# Patient Record
Sex: MALE | Race: WHITE | Employment: FULL TIME | URBAN - METROPOLITAN AREA
[De-identification: names, ages, dates, MRNs, and addresses within clinical notes are randomized per-mention and may not be internally consistent; named-entity substitution may affect disease eponyms.]

---

## 2018-10-12 ENCOUNTER — HOSPITAL ENCOUNTER (EMERGENCY)
Facility: HOSPITAL | Age: 23
Discharge: HOME | End: 2018-10-12
Attending: EMERGENCY MEDICINE
Payer: COMMERCIAL

## 2018-10-12 ENCOUNTER — APPOINTMENT (OUTPATIENT)
Dept: GENERAL RADIOLOGY | Facility: HOSPITAL | Age: 23
End: 2018-10-12
Attending: NURSE PRACTITIONER
Payer: COMMERCIAL

## 2018-10-12 VITALS
HEIGHT: 71 IN | OXYGEN SATURATION: 97 % | DIASTOLIC BLOOD PRESSURE: 98 MMHG | TEMPERATURE: 98 F | SYSTOLIC BLOOD PRESSURE: 149 MMHG | RESPIRATION RATE: 15 BRPM | HEART RATE: 98 BPM | WEIGHT: 180 LBS | BODY MASS INDEX: 25.2 KG/M2

## 2018-10-12 DIAGNOSIS — S61.231A GUNSHOT WOUND OF LEFT INDEX FINGER: Primary | ICD-10-CM

## 2018-10-12 PROCEDURE — 96361 HYDRATE IV INFUSION ADD-ON: CPT | Performed by: EMERGENCY MEDICINE

## 2018-10-12 PROCEDURE — 73130 X-RAY EXAM OF HAND: CPT | Performed by: NURSE PRACTITIONER

## 2018-10-12 PROCEDURE — 99284 EMERGENCY DEPT VISIT MOD MDM: CPT | Performed by: EMERGENCY MEDICINE

## 2018-10-12 PROCEDURE — 96375 TX/PRO/DX INJ NEW DRUG ADDON: CPT | Performed by: EMERGENCY MEDICINE

## 2018-10-12 PROCEDURE — 96365 THER/PROPH/DIAG IV INF INIT: CPT | Performed by: EMERGENCY MEDICINE

## 2018-10-12 PROCEDURE — 90471 IMMUNIZATION ADMIN: CPT | Performed by: EMERGENCY MEDICINE

## 2018-10-12 RX ORDER — NAPROXEN 500 MG/1
500 TABLET ORAL 2 TIMES DAILY PRN
Qty: 20 TABLET | Refills: 0 | Status: SHIPPED | OUTPATIENT
Start: 2018-10-12 | End: 2018-10-19

## 2018-10-12 RX ORDER — KETOROLAC TROMETHAMINE 30 MG/ML
30 INJECTION, SOLUTION INTRAMUSCULAR; INTRAVENOUS ONCE
Status: COMPLETED | OUTPATIENT
Start: 2018-10-12 | End: 2018-10-12

## 2018-10-12 RX ORDER — MORPHINE SULFATE 4 MG/ML
4 INJECTION, SOLUTION INTRAMUSCULAR; INTRAVENOUS ONCE
Status: COMPLETED | OUTPATIENT
Start: 2018-10-12 | End: 2018-10-12

## 2018-10-12 RX ORDER — DEXTROAMPHETAMINE SACCHARATE, AMPHETAMINE ASPARTATE MONOHYDRATE, DEXTROAMPHETAMINE SULFATE AND AMPHETAMINE SULFATE 6.25; 6.25; 6.25; 6.25 MG/1; MG/1; MG/1; MG/1
25 CAPSULE, EXTENDED RELEASE ORAL EVERY MORNING
COMMUNITY

## 2018-10-12 RX ORDER — BUPROPION HYDROCHLORIDE 100 MG/1
100 TABLET ORAL 2 TIMES DAILY
COMMUNITY

## 2018-10-12 RX ORDER — CEPHALEXIN 500 MG/1
500 CAPSULE ORAL 4 TIMES DAILY
Qty: 28 CAPSULE | Refills: 0 | Status: SHIPPED | OUTPATIENT
Start: 2018-10-12 | End: 2018-10-19

## 2018-10-12 RX ORDER — BUPIVACAINE HYDROCHLORIDE 2.5 MG/ML
INJECTION, SOLUTION EPIDURAL; INFILTRATION; INTRACAUDAL
Status: COMPLETED
Start: 2018-10-12 | End: 2018-10-12

## 2018-10-12 RX ORDER — HYDROCODONE BITARTRATE AND ACETAMINOPHEN 5; 325 MG/1; MG/1
1 TABLET ORAL EVERY 6 HOURS PRN
Qty: 20 TABLET | Refills: 0 | Status: SHIPPED | OUTPATIENT
Start: 2018-10-12

## 2018-10-12 RX ORDER — BUPIVACAINE HYDROCHLORIDE 2.5 MG/ML
5 INJECTION, SOLUTION EPIDURAL; INFILTRATION; INTRACAUDAL ONCE
Status: COMPLETED | OUTPATIENT
Start: 2018-10-12 | End: 2018-10-12
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
## 2018-11-13 PROBLEM — S62.631S: Status: ACTIVE | Noted: 2018-11-13

## 2021-09-07 ENCOUNTER — OFFICE VISIT (OUTPATIENT)
Dept: URGENT CARE | Facility: CLINIC | Age: 26
End: 2021-09-07
Payer: COMMERCIAL

## 2021-09-07 VITALS
HEIGHT: 71 IN | TEMPERATURE: 98 F | SYSTOLIC BLOOD PRESSURE: 147 MMHG | HEART RATE: 68 BPM | OXYGEN SATURATION: 99 % | BODY MASS INDEX: 24.5 KG/M2 | WEIGHT: 175 LBS | DIASTOLIC BLOOD PRESSURE: 96 MMHG | RESPIRATION RATE: 18 BRPM

## 2021-09-07 DIAGNOSIS — H61.23 BILATERAL IMPACTED CERUMEN: ICD-10-CM

## 2021-09-07 DIAGNOSIS — H92.03 OTALGIA OF BOTH EARS: Primary | ICD-10-CM

## 2021-09-07 PROCEDURE — 3080F PR MOST RECENT DIASTOLIC BLOOD PRESSURE >= 90 MM HG: ICD-10-PCS | Mod: CPTII,S$GLB,, | Performed by: FAMILY MEDICINE

## 2021-09-07 PROCEDURE — 1160F PR REVIEW ALL MEDS BY PRESCRIBER/CLIN PHARMACIST DOCUMENTED: ICD-10-PCS | Mod: CPTII,S$GLB,, | Performed by: FAMILY MEDICINE

## 2021-09-07 PROCEDURE — 3077F SYST BP >= 140 MM HG: CPT | Mod: CPTII,S$GLB,, | Performed by: FAMILY MEDICINE

## 2021-09-07 PROCEDURE — 3008F BODY MASS INDEX DOCD: CPT | Mod: CPTII,S$GLB,, | Performed by: FAMILY MEDICINE

## 2021-09-07 PROCEDURE — 99203 OFFICE O/P NEW LOW 30 MIN: CPT | Mod: S$GLB,,, | Performed by: FAMILY MEDICINE

## 2021-09-07 PROCEDURE — 1159F PR MEDICATION LIST DOCUMENTED IN MEDICAL RECORD: ICD-10-PCS | Mod: CPTII,S$GLB,, | Performed by: FAMILY MEDICINE

## 2021-09-07 PROCEDURE — 1160F RVW MEDS BY RX/DR IN RCRD: CPT | Mod: CPTII,S$GLB,, | Performed by: FAMILY MEDICINE

## 2021-09-07 PROCEDURE — 3008F PR BODY MASS INDEX (BMI) DOCUMENTED: ICD-10-PCS | Mod: CPTII,S$GLB,, | Performed by: FAMILY MEDICINE

## 2021-09-07 PROCEDURE — 3080F DIAST BP >= 90 MM HG: CPT | Mod: CPTII,S$GLB,, | Performed by: FAMILY MEDICINE

## 2021-09-07 PROCEDURE — 1159F MED LIST DOCD IN RCRD: CPT | Mod: CPTII,S$GLB,, | Performed by: FAMILY MEDICINE

## 2021-09-07 PROCEDURE — 99203 PR OFFICE/OUTPT VISIT, NEW, LEVL III, 30-44 MIN: ICD-10-PCS | Mod: S$GLB,,, | Performed by: FAMILY MEDICINE

## 2021-09-07 PROCEDURE — 3077F PR MOST RECENT SYSTOLIC BLOOD PRESSURE >= 140 MM HG: ICD-10-PCS | Mod: CPTII,S$GLB,, | Performed by: FAMILY MEDICINE

## 2021-09-07 RX ORDER — DEXTROAMPHETAMINE SACCHARATE, AMPHETAMINE ASPARTATE, DEXTROAMPHETAMINE SULFATE AND AMPHETAMINE SULFATE 2.5; 2.5; 2.5; 2.5 MG/1; MG/1; MG/1; MG/1
1 TABLET ORAL
COMMUNITY

## 2021-09-07 RX ORDER — BUPROPION HYDROCHLORIDE 100 MG/1
100 TABLET ORAL
COMMUNITY

## 2022-09-12 PROBLEM — F33.0 MDD (MAJOR DEPRESSIVE DISORDER), RECURRENT EPISODE, MILD (HCC): Status: ACTIVE | Noted: 2022-09-12

## 2022-09-12 PROBLEM — F40.10 SOCIAL ANXIETY DISORDER: Status: ACTIVE | Noted: 2022-09-12

## 2022-09-12 PROBLEM — F10.10 ALCOHOL USE DISORDER, MILD, ABUSE: Status: ACTIVE | Noted: 2022-09-12

## 2022-09-12 PROBLEM — S62.631S: Status: RESOLVED | Noted: 2018-11-13 | Resolved: 2022-09-12

## 2022-09-12 PROBLEM — F41.9 ANXIETY DISORDER, UNSPECIFIED: Status: ACTIVE | Noted: 2022-09-12

## 2022-09-12 PROBLEM — F40.10 SOCIAL ANXIETY DISORDER: Status: RESOLVED | Noted: 2022-09-12 | Resolved: 2022-09-12

## 2022-12-28 ENCOUNTER — OFFICE VISIT (OUTPATIENT)
Dept: OTHER | Facility: HOSPITAL | Age: 27
End: 2022-12-28
Attending: PREVENTIVE MEDICINE

## 2022-12-28 ENCOUNTER — HOSPITAL ENCOUNTER (OUTPATIENT)
Dept: GENERAL RADIOLOGY | Facility: HOSPITAL | Age: 27
Discharge: HOME | End: 2022-12-28
Attending: PREVENTIVE MEDICINE

## 2022-12-28 DIAGNOSIS — Z02.1 PRE-EMPLOYMENT EXAMINATION: Primary | ICD-10-CM

## 2022-12-28 DIAGNOSIS — Z02.1 PRE-EMPLOYMENT EXAMINATION: ICD-10-CM

## 2022-12-28 LAB
ALBUMIN SERPL-MCNC: 4.2 G/DL (ref 3.4–5)
ALP LIVER SERPL-CCNC: 53 U/L
ALT SERPL-CCNC: 40 U/L
AST SERPL-CCNC: 25 U/L (ref 15–37)
ATRIAL RATE: 65 BPM
BASOPHILS # BLD AUTO: 0.07 X10(3) UL (ref 0–0.2)
BASOPHILS NFR BLD AUTO: 1.4 %
BILIRUB SERPL-MCNC: 0.6 MG/DL (ref 0.1–2)
BILIRUB UR QL STRIP.AUTO: NEGATIVE
BUN BLD-MCNC: 11 MG/DL (ref 7–18)
CALCIUM BLD-MCNC: 9.3 MG/DL (ref 8.5–10.1)
CHLORIDE SERPL-SCNC: 108 MMOL/L (ref 98–112)
CHOLEST SERPL-MCNC: 130 MG/DL (ref ?–200)
CLARITY UR REFRACT.AUTO: CLEAR
CO2 SERPL-SCNC: 26 MMOL/L (ref 21–32)
CREAT BLD-MCNC: 0.98 MG/DL
EOSINOPHIL # BLD AUTO: 0.51 X10(3) UL (ref 0–0.7)
EOSINOPHIL NFR BLD AUTO: 10.3 %
ERYTHROCYTE [DISTWIDTH] IN BLOOD BY AUTOMATED COUNT: 12 %
GFR SERPLBLD BASED ON 1.73 SQ M-ARVRAT: 109 ML/MIN/1.73M2 (ref 60–?)
GGT SERPL-CCNC: 32 U/L
GLUCOSE BLD-MCNC: 95 MG/DL (ref 70–99)
GLUCOSE UR STRIP.AUTO-MCNC: NEGATIVE MG/DL
HCT VFR BLD AUTO: 41.2 %
HDLC SERPL-MCNC: 67 MG/DL (ref 40–59)
HGB BLD-MCNC: 14.9 G/DL
IMM GRANULOCYTES # BLD AUTO: 0 X10(3) UL (ref 0–1)
IMM GRANULOCYTES NFR BLD: 0 %
IRON SERPL-MCNC: 149 UG/DL
KETONES UR STRIP.AUTO-MCNC: NEGATIVE MG/DL
LDH SERPL L TO P-CCNC: 143 U/L
LDLC SERPL CALC-MCNC: 48 MG/DL (ref ?–100)
LEUKOCYTE ESTERASE UR QL STRIP.AUTO: NEGATIVE
LYMPHOCYTES # BLD AUTO: 1.81 X10(3) UL (ref 1–4)
LYMPHOCYTES NFR BLD AUTO: 36.7 %
MAGNESIUM SERPL-MCNC: 2 MG/DL (ref 1.6–2.6)
MCH RBC QN AUTO: 33.4 PG (ref 26–34)
MCHC RBC AUTO-ENTMCNC: 36.2 G/DL (ref 31–37)
MCV RBC AUTO: 92.4 FL
MONOCYTES # BLD AUTO: 0.36 X10(3) UL (ref 0.1–1)
MONOCYTES NFR BLD AUTO: 7.3 %
NEUTROPHILS # BLD AUTO: 2.18 X10 (3) UL (ref 1.5–7.7)
NEUTROPHILS # BLD AUTO: 2.18 X10(3) UL (ref 1.5–7.7)
NEUTROPHILS NFR BLD AUTO: 44.3 %
NITRITE UR QL STRIP.AUTO: NEGATIVE
NONHDLC SERPL-MCNC: 63 MG/DL (ref ?–130)
P AXIS: 70 DEGREES
P-R INTERVAL: 138 MS
PH UR STRIP.AUTO: 7 [PH] (ref 5–8)
PHOSPHATE SERPL-MCNC: 2.4 MG/DL (ref 2.5–4.9)
PLATELET # BLD AUTO: 237 10(3)UL (ref 150–450)
POTASSIUM SERPL-SCNC: 3.7 MMOL/L (ref 3.5–5.1)
PROT SERPL-MCNC: 7.3 G/DL (ref 6.4–8.2)
PROT UR STRIP.AUTO-MCNC: NEGATIVE MG/DL
Q-T INTERVAL: 414 MS
QRS DURATION: 124 MS
QTC CALCULATION (BEZET): 430 MS
R AXIS: 67 DEGREES
RBC # BLD AUTO: 4.46 X10(6)UL
RBC UR QL AUTO: NEGATIVE
SODIUM SERPL-SCNC: 139 MMOL/L (ref 136–145)
SP GR UR STRIP.AUTO: 1 (ref 1–1.03)
T AXIS: 58 DEGREES
TRIGL SERPL-MCNC: 73 MG/DL (ref 30–149)
URATE SERPL-MCNC: 2.9 MG/DL
UROBILINOGEN UR STRIP.AUTO-MCNC: <2 MG/DL
VENTRICULAR RATE: 65 BPM
VLDLC SERPL CALC-MCNC: 10 MG/DL (ref 0–30)
WBC # BLD AUTO: 4.9 X10(3) UL (ref 4–11)

## 2022-12-28 PROCEDURE — 93005 ELECTROCARDIOGRAM TRACING: CPT

## 2022-12-28 PROCEDURE — 80053 COMPREHEN METABOLIC PANEL: CPT

## 2022-12-28 PROCEDURE — 80061 LIPID PANEL: CPT

## 2022-12-28 PROCEDURE — 81003 URINALYSIS AUTO W/O SCOPE: CPT

## 2022-12-28 PROCEDURE — 85025 COMPLETE CBC W/AUTO DIFF WBC: CPT

## 2024-03-21 ENCOUNTER — TELEPHONE (OUTPATIENT)
Dept: SURGERY | Facility: CLINIC | Age: 29
End: 2024-03-21

## 2024-03-21 ENCOUNTER — HOSPITAL ENCOUNTER (EMERGENCY)
Facility: HOSPITAL | Age: 29
Discharge: HOME | End: 2024-03-21
Attending: EMERGENCY MEDICINE
Payer: OTHER MISCELLANEOUS

## 2024-03-21 ENCOUNTER — APPOINTMENT (OUTPATIENT)
Dept: ULTRASOUND IMAGING | Facility: HOSPITAL | Age: 29
End: 2024-03-21
Attending: EMERGENCY MEDICINE
Payer: OTHER MISCELLANEOUS

## 2024-03-21 VITALS
SYSTOLIC BLOOD PRESSURE: 154 MMHG | WEIGHT: 180 LBS | HEART RATE: 85 BPM | TEMPERATURE: 97 F | DIASTOLIC BLOOD PRESSURE: 109 MMHG | RESPIRATION RATE: 18 BRPM | BODY MASS INDEX: 26 KG/M2 | OXYGEN SATURATION: 99 %

## 2024-03-21 DIAGNOSIS — S31.31XA LACERATION OF SCROTUM, INITIAL ENCOUNTER: Primary | ICD-10-CM
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
## 2024-03-21 LAB
ALBUMIN SERPL-MCNC: 4.2 G/DL (ref 3.4–5)
ALBUMIN/GLOB SERPL: 1.2 {RATIO} (ref 1–2)
ALP LIVER SERPL-CCNC: 54 U/L
ALT SERPL-CCNC: 33 U/L
ANION GAP SERPL CALC-SCNC: 5 MMOL/L (ref 0–18)
AST SERPL-CCNC: 31 U/L (ref 15–37)
BASOPHILS # BLD AUTO: 0.07 X10(3) UL (ref 0–0.2)
BASOPHILS NFR BLD AUTO: 1.1 %
BILIRUB SERPL-MCNC: 0.5 MG/DL (ref 0.1–2)
BILIRUB UR QL STRIP.AUTO: NEGATIVE
BUN BLD-MCNC: 20 MG/DL (ref 9–23)
CALCIUM BLD-MCNC: 9.5 MG/DL (ref 8.5–10.1)
CHLORIDE SERPL-SCNC: 107 MMOL/L (ref 98–112)
CLARITY UR REFRACT.AUTO: CLEAR
CO2 SERPL-SCNC: 24 MMOL/L (ref 21–32)
COLOR UR AUTO: YELLOW
CREAT BLD-MCNC: 1.21 MG/DL
EGFRCR SERPLBLD CKD-EPI 2021: 84 ML/MIN/1.73M2 (ref 60–?)
EOSINOPHIL # BLD AUTO: 0.54 X10(3) UL (ref 0–0.7)
EOSINOPHIL NFR BLD AUTO: 8.7 %
ERYTHROCYTE [DISTWIDTH] IN BLOOD BY AUTOMATED COUNT: 12.2 %
GLOBULIN PLAS-MCNC: 3.4 G/DL (ref 2.8–4.4)
GLUCOSE BLD-MCNC: 87 MG/DL (ref 70–99)
GLUCOSE UR STRIP.AUTO-MCNC: NORMAL MG/DL
HCT VFR BLD AUTO: 39.8 %
HGB BLD-MCNC: 14 G/DL
IMM GRANULOCYTES # BLD AUTO: 0.01 X10(3) UL (ref 0–1)
IMM GRANULOCYTES NFR BLD: 0.2 %
LEUKOCYTE ESTERASE UR QL STRIP.AUTO: NEGATIVE
LYMPHOCYTES # BLD AUTO: 1.88 X10(3) UL (ref 1–4)
LYMPHOCYTES NFR BLD AUTO: 30.1 %
MCH RBC QN AUTO: 32.3 PG (ref 26–34)
MCHC RBC AUTO-ENTMCNC: 35.2 G/DL (ref 31–37)
MCV RBC AUTO: 91.7 FL
MONOCYTES # BLD AUTO: 0.43 X10(3) UL (ref 0.1–1)
MONOCYTES NFR BLD AUTO: 6.9 %
NEUTROPHILS # BLD AUTO: 3.31 X10 (3) UL (ref 1.5–7.7)
NEUTROPHILS # BLD AUTO: 3.31 X10(3) UL (ref 1.5–7.7)
NEUTROPHILS NFR BLD AUTO: 53 %
NITRITE UR QL STRIP.AUTO: NEGATIVE
OSMOLALITY SERPL CALC.SUM OF ELEC: 284 MOSM/KG (ref 275–295)
PH UR STRIP.AUTO: 6 [PH] (ref 5–8)
PLATELET # BLD AUTO: 257 10(3)UL (ref 150–450)
POTASSIUM SERPL-SCNC: 3.9 MMOL/L (ref 3.5–5.1)
PROT SERPL-MCNC: 7.6 G/DL (ref 6.4–8.2)
PROT UR STRIP.AUTO-MCNC: NEGATIVE MG/DL
RBC # BLD AUTO: 4.34 X10(6)UL
RBC UR QL AUTO: NEGATIVE
SODIUM SERPL-SCNC: 136 MMOL/L (ref 136–145)
SP GR UR STRIP.AUTO: 1.01 (ref 1–1.03)
UROBILINOGEN UR STRIP.AUTO-MCNC: NORMAL MG/DL
WBC # BLD AUTO: 6.2 X10(3) UL (ref 4–11)

## 2024-03-21 PROCEDURE — 93975 VASCULAR STUDY: CPT | Performed by: EMERGENCY MEDICINE

## 2024-03-21 PROCEDURE — 12002 RPR S/N/AX/GEN/TRNK2.6-7.5CM: CPT

## 2024-03-21 PROCEDURE — 80053 COMPREHEN METABOLIC PANEL: CPT | Performed by: EMERGENCY MEDICINE

## 2024-03-21 PROCEDURE — 96375 TX/PRO/DX INJ NEW DRUG ADDON: CPT

## 2024-03-21 PROCEDURE — 96374 THER/PROPH/DIAG INJ IV PUSH: CPT

## 2024-03-21 PROCEDURE — 81003 URINALYSIS AUTO W/O SCOPE: CPT | Performed by: EMERGENCY MEDICINE

## 2024-03-21 PROCEDURE — 85025 COMPLETE CBC W/AUTO DIFF WBC: CPT | Performed by: EMERGENCY MEDICINE

## 2024-03-21 PROCEDURE — 76870 US EXAM SCROTUM: CPT | Performed by: EMERGENCY MEDICINE

## 2024-03-21 PROCEDURE — 90471 IMMUNIZATION ADMIN: CPT

## 2024-03-21 PROCEDURE — 99284 EMERGENCY DEPT VISIT MOD MDM: CPT

## 2024-03-21 PROCEDURE — 99285 EMERGENCY DEPT VISIT HI MDM: CPT

## 2024-03-21 RX ORDER — AMOXICILLIN AND CLAVULANATE POTASSIUM 875; 125 MG/1; MG/1
1 TABLET, FILM COATED ORAL 2 TIMES DAILY
Qty: 20 TABLET | Refills: 0 | Status: SHIPPED | OUTPATIENT
Start: 2024-03-21 | End: 2024-03-31

## 2024-03-21 RX ORDER — LIDOCAINE HYDROCHLORIDE 10 MG/ML
20 INJECTION, SOLUTION INFILTRATION; PERINEURAL ONCE
Status: COMPLETED | OUTPATIENT
Start: 2024-03-21 | End: 2024-03-21

## 2024-03-21 RX ORDER — ONDANSETRON 2 MG/ML
4 INJECTION INTRAMUSCULAR; INTRAVENOUS ONCE
Status: COMPLETED | OUTPATIENT
Start: 2024-03-21 | End: 2024-03-21

## 2024-03-21 RX ORDER — LIDOCAINE HYDROCHLORIDE 10 MG/ML
INJECTION, SOLUTION INFILTRATION; PERINEURAL
Status: COMPLETED
Start: 2024-03-21 | End: 2024-03-21

## 2024-03-21 RX ORDER — CEFAZOLIN SODIUM/WATER 2 G/20 ML
2 SYRINGE (ML) INTRAVENOUS ONCE
Status: COMPLETED | OUTPATIENT
Start: 2024-03-21 | End: 2024-03-21

## 2024-03-21 NOTE — CONSULTS
Urology Inpatient Consult Note  [facility]   part of [facility]      Shayan Keane Patient Status:  Emergency    1995 MRN [id number]   Location [facility] EMERGENCY DEPARTMENT Attending Mono Heath MD   Hosp Day # 0 PCP Ross Dixon DO     Date of Admission:  3/21/2024  Date of Consult: 3/21/2024  Primary Care Provider: Ross Dixon DO     Consulting Provider: ED    Reason for Consultation:   Scrotal laceration     History of Present Illness:   Shayan Keane is a 28 year old male with history of ADHD,  headache presenting with scrotal trauma.     Patient with no previous urologic history. 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 He then noted that his pants had torn and he had a small 2cm laceration to the left scrotal skin. This wound was washed and gauze was placed and patient brought to the ED. Patient is confident that he did not have any penetration to the scrotum, no skin was lost.   No active bleeding since injury . No testicular pain. No gross hematuria; voiding without issue. In the ED; labs all normal. UA normal. Vitals normal. Scrotal US with healthy appearing testicles; no hematoma. Right varicocele noted. Small amount of left scrotal skin thickening; c/w injury.   No systemic symptoms. Pain controlled. Bleeding stopped since ED arrival.           History:     Past Medical History:   Diagnosis Date    ADHD     Depression     Headache disorder     Open displaced fracture of distal phalanx of left index finger, sequela 2018       Past Surgical History:   Procedure Laterality Date    OTHER SURGICAL HISTORY      L shoulder, repair for torn AC       Family History   Problem Relation Age of Onset    Depression Father     ADHD Father     Anxiety Mother     Depression Mother     Substance Abuse Mother     Depression Maternal Grandfather     Alcohol and  Other Disorders Associated Maternal Grandfather     Alcohol and Other Disorders Associated Paternal Grandfather     OCD Sister     Anxiety Sister     Substance Abuse Paternal Aunt     Alcohol and Other Disorders Associated Paternal Uncle     Substance Abuse Paternal Uncle     Alcohol and Other Disorders Associated Paternal Uncle        Social History     Socioeconomic History    Marital status: Single   Tobacco Use    Smoking status: Former     Packs/day: 0.50     Years: 10.00     Additional pack years: 0.00     Total pack years: 5.00     Types: Cigarettes     Quit date: 2021     Years since quittin.6    Smokeless tobacco: Never   Vaping Use    Vaping Use: Never used   Substance and Sexual Activity    Alcohol use: Yes     Alcohol/week: 2.0 standard drinks of alcohol     Types: 2 Shots of liquor per week     Comment: 5-6 drinks on weekends about 2 shots per drink. Last drink 2022.    Drug use: Never       Medications:  Current Outpatient Medications   Medication Sig Dispense Refill    clonazePAM 1 MG Oral Tab Take 1 tablet (1 mg total) by mouth 3 (three) times daily as needed for Anxiety.      Amphetamine-Dextroamphet ER 25 MG Oral Capsule SR 24 Hr Take 12 mg by mouth every morning. Holds during summer      BuPROPion HCl 100 MG Oral Tab Take 1.5 tablets (150 mg total) by mouth daily.      Polyethylene Glycol 3350 (MIRALAX OR) Take 17 g by mouth daily as needed.         Allergies:  Allergies   Allergen Reactions    Dander OTHER (SEE COMMENTS)     Sneezing, itchy throat and swollen eyes.    Morphine NAUSEA AND VOMITING       Review of Systems:   A comprehensive 10-point review of systems was completed.  Pertinent positives and negatives are noted in the the HPI.    Physical Exam:   Vital Signs:  Blood pressure (!) 154/95, pulse 109, temperature 97 °F (36.1 °C), temperature source Temporal, resp. rate 18, weight 180 lb (81.6 kg), SpO2 99%.     CONSTITUTIONAL: Well developed, well nourished, in no acute  distress  NEUROLOGIC: Alert and oriented  HEAD: Normocephalic, atraumatic  EYES: Sclera non-icteric  ENT: Hearing intact, moist mucous membranes  NECK: No obvious goiter or masses  RESPIRATORY: Normal respiratory effort  SKIN: No evident rashes  ABDOMEN: Soft, non-tender, non-distended   GENITOURINARY: Normal phallus, orthotopic meatus, scrotal skin with superficial laceration along left side; about 2-3cm in size. Healthy skin edges. I probed this area and the dartos tissue appears intact. No ongoing bleeding. No signs of infection. No drainage from wound.     Laboratory Data:  Lab Results   Component Value Date    WBC 6.2 03/21/2024    HGB 14.0 03/21/2024    .0 03/21/2024     Lab Results   Component Value Date     03/21/2024    K 3.9 03/21/2024     03/21/2024    CO2 24.0 03/21/2024    BUN 20 03/21/2024    GLU 87 03/21/2024    AST 31 03/21/2024    ALT 33 03/21/2024    TP 7.6 03/21/2024    ALB 4.2 03/21/2024    PHOS 2.4 (L) 12/28/2022    CA 9.5 03/21/2024    MG 2.0 12/28/2022       Urinalysis Results (last three years):  Recent Labs     12/28/22  1210   COLORUR Straw   CLARITY Clear   SPECGRAVITY 1.004   PHURINE 7.0   PROUR Negative   GLUUR Negative   KETUR Negative   BILUR Negative   BLOODURINE Negative   NITRITE Negative   UROBILINOGEN <2.0   LEUUR Negative       Urine Culture Results (last three years):  No results found for: \"URINECUL\"    PSA:  No results found for: \"PSA\", \"PERCENTPSA\", \"PSAS\", \"PSAULTRA\", \"QPSA\", \"PSATOT\", \"TOTPSADX\", \"TOTPSASCREEN\"     Imaging (last three days):  US SCROTUM W/ DOPPLER (CPT=93975/30497)    Result Date: 3/21/2024  CONCLUSION:  1. Unremarkable sonographic appearance of the testes.  No sonographic evidence of testicular torsion.  No sonographic evidence of a traumatic testicular injury. 2. There is left scrotal skin thickening.  This likely corresponds to the scrotal injury. 3. There is an isolated right-sided varicocele.   Isolated right-sided varicoceles are  rare and therefore a follow-up contrast-enhanced abdominal/pelvic CT is recommended to exclude a secondary varicocele.  A secondary varicocele could be due to compression on the testicular vein from retroperitoneal adenopathy, renal mass, nutcracker syndrome or splenorenal shunting among other etiologies.   LOCATION:  [facility]    Dictated by (CST): Stromberg, LeRoy, MD on 3/21/2024 at 5:33 PM     Finalized by (CST): Stromberg, LeRoy, MD on 3/21/2024 at 5:42 PM           Assessment/Plan:    Shayan Keane is a 28 year old male with history of ADHD,  headache presenting with scrotal trauma.     History consistent with scrotal laceration from catching portion of scrotum on a metal bolt with no penetrating trauma and no significant blunt trauma per patient. His exam was c/w superficial laceration with healthy appearing tissue underneath and no violation of dartos. No ongoing bleeding. Testicular US also reassuring; no hematoma or testicular injury. [place] of testicle normal in appearance. No signs of urethral trauma; no gross or microscopic hematuria.   Discussed guidelines for surgical exploration; for now patient with superficial laceration; no indication to explore. I discussed this with patient as well and offered OR closure with exploration vs bedside.   Discussed with patient and ED and decision made for primary closure in ED. Dr. Heath to close; discussed cleaning with betadine thoroughly before.   Patient given ancef, tetanus shot.   Recommend 14 day Augmentin. Recommend bacitracin to incision line. Scrotal support and ice packs.   Return to ED with any worsening symptoms or signs of infection.   Return to clinic in 2-3 weeks  Will discuss further imaging for his RIGHT varicocele     I have personally reviewed all relevant medical records, labs, and imaging.    Thank you for this consult. Please call if there are any questions or concerns.        Efrain Macario MD  Staff Urologist  [facility]  Office:  [phone]
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
## 2024-03-26 ENCOUNTER — OFFICE VISIT (OUTPATIENT)
Dept: OTHER | Facility: HOSPITAL | Age: 29
End: 2024-03-26
Attending: PREVENTIVE MEDICINE

## 2024-03-26 ENCOUNTER — APPOINTMENT (OUTPATIENT)
Dept: OTHER | Facility: HOSPITAL | Age: 29
End: 2024-03-26
Attending: PREVENTIVE MEDICINE

## 2024-04-08 NOTE — PROGRESS NOTES
Urology Clinic Note    Primary Care Provider:  Ross Dixon DO     Chief Complaint:   Scrotal laceration.     HPI:   Shayan Keane is a 28 year old male with history of scrotal laceration here for follow up.     Patient initially seen in ED as consult 3/21;    Patient with no previous urologic history. 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 He then noted that his pants had torn and he had a small 2cm laceration to the left scrotal skin. This wound was washed and gauze was placed and patient brought to the ED. Patient is confident that he did not have any penetration to the scrotum, no skin was lost. In the ED; no bleeding. Labs/UA negative. Voiding OK. US without injury or significant hematoma. Right varicocele was noted. On exam; no bleeding; appeared superficial laceration without dartos violation. This was cleaned/closed with absorbable sutures by ED attending. He is now here for follow up. Was given Abx for 2 weeks and bacitracin ointment. Tetanus given in ED.   Doing very well today. No pain in the scrotum. Back to work. Wound healing well with no s/s of infection. He reports 4 stitches remain and have not yet dissolved. No drainage. No redness.   He has no scrotal content pain from his varicocele; has fathered 1 child.          PSA:  No results found for: \"PSA\", \"PERCENTPSA\", \"PSAS\", \"PSAULTRA\", \"QPSA\", \"PSATOT\", \"TOTPSADX\", \"TOTPSASCREEN\"     History:     Past Medical History:   Diagnosis Date    ADHD     Depression     Headache disorder     Open displaced fracture of distal phalanx of left index finger, sequela 11/13/2018       Past Surgical History:   Procedure Laterality Date    OTHER SURGICAL HISTORY      L shoulder, repair for torn AC       Family History   Problem Relation Age of Onset    Depression Father     ADHD Father     Anxiety Mother     Depression Mother     Substance  Abuse Mother     Depression Maternal Grandfather     Alcohol and Other Disorders Associated Maternal Grandfather     Alcohol and Other Disorders Associated Paternal Grandfather     OCD Sister     Anxiety Sister     Substance Abuse Paternal Aunt     Alcohol and Other Disorders Associated Paternal Uncle     Substance Abuse Paternal Uncle     Alcohol and Other Disorders Associated Paternal Uncle        Social History     Socioeconomic History    Marital status: Single   Tobacco Use    Smoking status: Former     Packs/day: 0.50     Years: 10.00     Additional pack years: 0.00     Total pack years: 5.00     Types: Cigarettes     Quit date: 2021     Years since quittin.6    Smokeless tobacco: Never   Vaping Use    Vaping Use: Never used   Substance and Sexual Activity    Alcohol use: Yes     Alcohol/week: 2.0 standard drinks of alcohol     Types: 2 Shots of liquor per week     Comment: 5-6 drinks on weekends about 2 shots per drink. Last drink 2022.    Drug use: Never       Medications (Active prior to today's visit):  Current Outpatient Medications   Medication Sig Dispense Refill    clonazePAM 1 MG Oral Tab Take 1 tablet (1 mg total) by mouth 3 (three) times daily as needed for Anxiety.      Polyethylene Glycol 3350 (MIRALAX OR) Take 17 g by mouth daily as needed.      Amphetamine-Dextroamphet ER 25 MG Oral Capsule SR 24 Hr Take 12 mg by mouth every morning. Holds during summer      BuPROPion HCl 100 MG Oral Tab Take 1.5 tablets (150 mg total) by mouth daily.         Allergies:  Allergies   Allergen Reactions    Dander OTHER (SEE COMMENTS)     Sneezing, itchy throat and swollen eyes.    Morphine NAUSEA AND VOMITING       Review of Systems:   A comprehensive 10-point review of systems was completed.  Pertinent positives and negatives are noted in the the HPI.    Physical Exam:   CONSTITUTIONAL: Well developed, well nourished, in no acute distress  NEUROLOGIC: Alert and oriented  HEAD: Normocephalic,  atraumatic  ABDOMEN: Soft, non-tender, non-distended  GENITOURINARY: Normal phallus, orthotopic meatus, normal bilateral testicles with mild small bilateral varicocele - small ~3cm left sided laceration closed with 4 interrupted vicryl sutures; healing appropriately with great skin reapproximation and no color changes or drainage     Assessment & Plan:   Shayan Keane is a 28 year old male with history of scrotal laceration here for follow up.    # Scrotal laceration; doing great; healing appropriately. No further activity restrictions. Reviewed that stitches will dissolve in coming weeks. Can continue antibiotics ointment for 1 more week.   # Varicocele  We discussed the diagnosis of varicocele in detail. I explained that this is a group of dilated veins in the pampiniform plexus and is most common on the elft side.W did discuss that a palpable varicocele can cause infertility issues due to warm venous blood pooling around the testis. We discussed that indications for surgery include symptoms of pain, abnormal semen parameters/infertility with a female partner with normal fertility, or adolescents with ipsilateral small testis. We discussed surgical varicocelectomy briefly. We also discussed embolization which has a higher recurrence rate than surgical optios (inguinal varicocelectomy with 3% rate vs embolization with 13%).   He has no pain at this time and appears mild on exam; given bilateral nature we discussed obtaining imaging per radiology recommendations to rule out any intraabdominal pathology. He would like to proceed. Will order.     Return in ~2 months for exam and to discuss results of CT; will do in about 1 month     In total, 20 minutes were spent on this patient encounter (including chart review, patient history, physical, and counseling, documentation, and communication).      Efrain Macario MD  Staff Urologist  [facility]  Office: [phone]

## 2024-04-10 ENCOUNTER — OFFICE VISIT (OUTPATIENT)
Dept: SURGERY | Facility: CLINIC | Age: 29
End: 2024-04-10

## 2024-04-10 DIAGNOSIS — I86.1 VARICOCELE: Primary | ICD-10-CM

## 2024-04-10 PROCEDURE — 99213 OFFICE O/P EST LOW 20 MIN: CPT | Performed by: UROLOGY

## 2024-04-23 PROCEDURE — 88311 DECALCIFY TISSUE: CPT | Performed by: CLINICAL MEDICAL LABORATORY

## 2024-04-23 PROCEDURE — 88305 TISSUE EXAM BY PATHOLOGIST: CPT | Performed by: CLINICAL MEDICAL LABORATORY

## 2024-04-24 ENCOUNTER — LAB REQUISITION (OUTPATIENT)
Dept: LAB | Age: 29
End: 2024-04-24

## 2024-04-24 DIAGNOSIS — J32.0 CHRONIC MAXILLARY SINUSITIS: ICD-10-CM

## 2024-04-29 LAB
ASR DISCLAIMER: NORMAL
CASE RPRT: NORMAL
CLINICAL INFO: NORMAL
PATH REPORT.FINAL DX SPEC: NORMAL
PATH REPORT.GROSS SPEC: NORMAL

## 2024-06-09 NOTE — PROGRESS NOTES
Urology Clinic Note    Primary Care Provider:  Ross Dixon DO     Chief Complaint:   Scrotal laceration     HPI:     Shayan Keane is a 28 year old male with history of scrotal laceration here for follow up.     Patient initially seen in ED as consult 3/21;    Patient with no previous urologic history. 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 He then noted that his pants had torn and he had a small 2cm laceration to the left scrotal skin. This wound was washed and gauze was placed and patient brought to the ED. Patient is confident that he did not have any penetration to the scrotum, no skin was lost. In the ED; no bleeding. Labs/UA negative. Voiding OK. US without injury or significant hematoma. Right varicocele was noted. On exam; no bleeding; appeared superficial laceration without dartos violation. This was cleaned/closed with absorbable sutures by ED attending. He is now here for follow up. Was given Abx for 2 weeks and bacitracin ointment. Tetanus given in ED.   He saw me shortly after- was doing great. Stitches had mostly dissolved and was healing well.   For his varicocele; CT ordered but not done.     Doing well today; states the area took some time to heal; mostly due to his work causing lots of sweating in the area and needing to be on his feet. Now healed completely- has some raised skin in middle of laceration but no drainage; no infectious signs, minimal dicomfort to the area.   No other urologic concerns.         PSA:  No results found for: \"PSA\", \"PERCENTPSA\", \"PSAS\", \"PSAULTRA\", \"QPSA\", \"PSATOT\", \"TOTPSADX\", \"TOTPSASCREEN\"     History:     Past Medical History:    ADHD    Depression    Headache disorder    Open displaced fracture of distal phalanx of left index finger, sequela       Past Surgical History:   Procedure Laterality Date    Other surgical history      L shoulder,  repair for torn AC       Family History   Problem Relation Age of Onset    Depression Father     ADHD Father     Anxiety Mother     Depression Mother     Substance Abuse Mother     Depression Maternal Grandfather     Alcohol and Other Disorders Associated Maternal Grandfather     Alcohol and Other Disorders Associated Paternal Grandfather     OCD Sister     Anxiety Sister     Substance Abuse Paternal Aunt     Alcohol and Other Disorders Associated Paternal Uncle     Substance Abuse Paternal Uncle     Alcohol and Other Disorders Associated Paternal Uncle        Social History     Socioeconomic History    Marital status: Single   Tobacco Use    Smoking status: Former     Current packs/day: 0.00     Average packs/day: 0.5 packs/day for 10.0 years (5.0 ttl pk-yrs)     Types: Cigarettes     Start date: 2011     Quit date: 2021     Years since quittin.8    Smokeless tobacco: Never   Vaping Use    Vaping status: Never Used   Substance and Sexual Activity    Alcohol use: Yes     Alcohol/week: 2.0 standard drinks of alcohol     Types: 2 Shots of liquor per week     Comment: 5-6 drinks on weekends about 2 shots per drink. Last drink 2022.    Drug use: Never       Medications (Active prior to today's visit):  Current Outpatient Medications   Medication Sig Dispense Refill    clonazePAM 1 MG Oral Tab Take 1 tablet (1 mg total) by mouth 3 (three) times daily as needed for Anxiety.      Polyethylene Glycol 3350 (MIRALAX OR) Take 17 g by mouth daily as needed.      Amphetamine-Dextroamphet ER 25 MG Oral Capsule SR 24 Hr Take 12 mg by mouth every morning. Holds during summer      BuPROPion HCl 100 MG Oral Tab Take 1.5 tablets (150 mg total) by mouth daily.         Allergies:  Allergies   Allergen Reactions    Dander OTHER (SEE COMMENTS)     Sneezing, itchy throat and swollen eyes.    Morphine NAUSEA AND VOMITING       Review of Systems:   A comprehensive 10-point review of systems was completed.  Pertinent  positives and negatives are noted in the the HPI.    Physical Exam:   CONSTITUTIONAL: Well developed, well nourished, in no acute distress  NEUROLOGIC: Alert and oriented  HEAD: Normocephalic, atraumatic  SKIN: No evident rashes  ABDOMEN: Soft, non-tender, non-distended  GENITOURINARY: Normal phallus, orthotopic meatus, normal bilateral testicles with mild small bilateral varicocele -   Left sided laceration healed appropriately; central ~5mm of skin slightly raised/assymetric but non tender with no s/s of infection or openings        Assessment & Plan:     Shayan Keane is a 28 year old male with history of scrotal laceration here for follow up.    # Varicocele; see previous note for full counseling, complete CT as ordered- message or call with results  # Laceration: healed very nicely; has small area of raised tissue in the middle but he states this has also been improving and is not bothersome; again discussed hygiene recommendations - he may see me as needed (he will let us know if any pain to the area or if slightly raised tissue becomes bothersome)      In total, 15 minutes were spent on this patient encounter (including chart review, patient history, physical, and counseling, documentation, and communication).      Efrain Macario MD  Staff Urologist  [facility]  Office: [phone]

## 2024-06-10 ENCOUNTER — OFFICE VISIT (OUTPATIENT)
Dept: SURGERY | Facility: CLINIC | Age: 29
End: 2024-06-10

## 2024-06-10 DIAGNOSIS — S31.31XD LACERATION OF SCROTUM, SUBSEQUENT ENCOUNTER: Primary | ICD-10-CM

## 2024-06-10 DIAGNOSIS — I86.1 VARICOCELE: ICD-10-CM

## 2024-06-10 PROCEDURE — 99212 OFFICE O/P EST SF 10 MIN: CPT | Performed by: UROLOGY

## 2024-07-16 ENCOUNTER — APPOINTMENT (OUTPATIENT)
Dept: GENERAL RADIOLOGY | Facility: HOSPITAL | Age: 29
End: 2024-07-16
Attending: EMERGENCY MEDICINE
Payer: OTHER MISCELLANEOUS

## 2024-07-16 ENCOUNTER — HOSPITAL ENCOUNTER (EMERGENCY)
Facility: HOSPITAL | Age: 29
Discharge: HOME | End: 2024-07-16
Attending: EMERGENCY MEDICINE
Payer: OTHER MISCELLANEOUS

## 2024-07-16 VITALS
OXYGEN SATURATION: 100 % | WEIGHT: 220.44 LBS | SYSTOLIC BLOOD PRESSURE: 126 MMHG | RESPIRATION RATE: 18 BRPM | BODY MASS INDEX: 32 KG/M2 | TEMPERATURE: 98 F | DIASTOLIC BLOOD PRESSURE: 84 MMHG | HEART RATE: 76 BPM

## 2024-07-16 DIAGNOSIS — S62.669B: Primary | ICD-10-CM

## 2024-07-16 DIAGNOSIS — S61.219A LACERATION OF FINGER WITHOUT FOREIGN BODY WITHOUT DAMAGE TO NAIL, UNSPECIFIED FINGER, UNSPECIFIED LATERALITY, INITIAL ENCOUNTER: ICD-10-CM

## 2024-07-16 PROCEDURE — 12001 RPR S/N/AX/GEN/TRNK 2.5CM/<: CPT

## 2024-07-16 PROCEDURE — 99283 EMERGENCY DEPT VISIT LOW MDM: CPT

## 2024-07-16 PROCEDURE — 73140 X-RAY EXAM OF FINGER(S): CPT | Performed by: EMERGENCY MEDICINE

## 2024-07-16 RX ORDER — LIDOCAINE HYDROCHLORIDE 10 MG/ML
1 INJECTION, SOLUTION INFILTRATION; PERINEURAL ONCE
Status: COMPLETED | OUTPATIENT
Start: 2024-07-16 | End: 2024-07-16

## 2024-07-16 RX ORDER — CEPHALEXIN 500 MG/1
500 CAPSULE ORAL 4 TIMES DAILY
Qty: 40 CAPSULE | Refills: 0 | Status: SHIPPED | OUTPATIENT
Start: 2024-07-16 | End: 2024-07-26

## 2024-07-16 RX ORDER — CEPHALEXIN 500 MG/1
500 CAPSULE ORAL ONCE
Status: COMPLETED | OUTPATIENT
Start: 2024-07-16 | End: 2024-07-16

## 2024-07-16 RX ORDER — LIDOCAINE HYDROCHLORIDE 10 MG/ML
INJECTION, SOLUTION INFILTRATION; PERINEURAL
Status: COMPLETED
Start: 2024-07-16 | End: 2024-07-16

## 2024-07-16 NOTE — ED PROVIDER NOTES
Patient Seen in: [facility] Emergency Department      History     Chief Complaint   Patient presents with    Laceration/Abrasion     Stated Complaint: lac to index finger left hand    Subjective:   HPI  21-year-old man here for evaluation of laceration of left second digit.  Patient is left-handed he was at work, was sliding the pad for the outriggers on a work vehicle back into the truck when the lid came down, crush tip of left finger.  Tetanus updated 3/24.  No other injuries or complaints at this time.      Objective:   Past Medical History:    ADHD    Depression    Headache disorder    Open displaced fracture of distal phalanx of left index finger, sequela              Past Surgical History:   Procedure Laterality Date    Other surgical history      L shoulder, repair for torn AC                Social History     Socioeconomic History    Marital status: Single   Tobacco Use    Smoking status: Former     Current packs/day: 0.00     Average packs/day: 0.5 packs/day for 10.0 years (5.0 ttl pk-yrs)     Types: Cigarettes     Start date: 2011     Quit date: 2021     Years since quittin.9    Smokeless tobacco: Never   Vaping Use    Vaping status: Never Used   Substance and Sexual Activity    Alcohol use: Yes     Alcohol/week: 2.0 standard drinks of alcohol     Types: 2 Shots of liquor per week     Comment: 5-6 drinks on weekends about 2 shots per drink. Last drink 2022.    Drug use: Never              Review of Systems    Positive for stated Chief Complaint: Laceration/Abrasion    Other systems are as noted in HPI.  Constitutional and vital signs reviewed.      All other systems reviewed and negative except as noted above.    Physical Exam     ED Triage Vitals [24 1811]   /84   Pulse 76   Resp 18   Temp 98.3 °F (36.8 °C)   Temp src Oral   SpO2 100 %   O2 Device        Current Vitals:   Vital Signs  BP: 126/84  Pulse: 76  Resp: 18  Temp: 98.3 °F (36.8 °C)  Temp src: Oral    Oxygen  Therapy  SpO2: 100 %            Physical Exam    Vitals signs and nursing note reviewed.   General: Young man sitting up in bed in no acute distress  Head: Normocephalic and atraumatic.   Pulmonary:  Breathing comfortably, no respiratory distress.  Left hand: Index finger the left hand there is a 2.5 cm full-thickness laceration does not extend into the nailbed which appears chronically deformed, patient able to flex and extend the DIP PIP MCP of the second digit.  Neurological: Awake alert, speech is normal        ED Course   Labs Reviewed - No data to display  XR FINGER(S) (MIN 2 VIEWS), LEFT 2ND (CPT=73140)    Result Date: 7/16/2024  PROCEDURE:  XR FINGER(S) (MIN 2 VIEWS), LEFT 2ND (CPT=73140)  INDICATIONS:  Laceration/crush injury over distal phalanx  COMPARISON:  [facility] , XR, XR HAND (MIN 3 VIEWS), LEFT (CPT=73130), 10/12/2018, 1:40 PM.  TECHNIQUE:  Three views of the finger were obtained.  PATIENT STATED HISTORY: (As transcribed by Technologist)  Pt. with 2nd digit    Laceration/crush injury over distal phalanx.    FINDINGS:  There is marked deformity of the distal phalange of the index finger.  Most of this appears to be chronic and related to prior fracture.  There is cortical disruption dorsal aspect of distal phalange noted on lateral view which could potentially represent acute fracture superimposed on the chronic deformity.  There is soft tissue swelling.             CONCLUSION:  1. There is chronic deformity distal phalange of index finger. 2. There is subtle disruption dorsal cortex of distal phalange on the lateral view which could represent superimposed acute fracture assuming an acute injury and the patient is tender here.   LOCATION:  [facility]   Dictated by (CST): Saleem Ledesma MD on 7/16/2024 at 7:02 PM     Finalized by (CST): Saleem Ledesma MD on 7/16/2024 at 7:04 PM            Laceration was anesthetized with lidocaine using a digital block.  The wound was cleansed and irrigated copiously.  There  was no visible foreign body within the wound. The wound was closed using a simple closure with 4-0 nylon.  The quality of the closure was excellent           MDM      29-year-old man here with workplace injury, laceration to the left second digit does not the nailbed.  Differential includes laceration, fracture, retained foreign body.  Will obtain an x-ray to rule out fracture/foreign body, tetanus updated 3/24                                   Medical Decision Making      Disposition and Plan     Clinical Impression:  1. Open nondisplaced fracture of distal phalanx of finger, unspecified finger, initial encounter    2. Laceration of finger without foreign body without damage to nail, unspecified finger, unspecified laterality, initial encounter         Disposition:  Discharge  7/16/2024  7:43 pm    Follow-up:  Ross Dixon DO  [address]   [address]  [facility] [address] [phone]    Follow up  Follow-up with your PMD for reevaluation in 24 to 48 hours.  Return to ER if symptoms worsen or change or if any other new concerns.    Suture removal in 10 days    [place] [address], [address]  [facility] [address] [phone]  Follow up  Follow-up with occupational health office at [facility] may follow-up with the occupational health office at the [facility].    [facility]  [address]  [facility] [address] [phone]  Call in 1 day  Work related injury follow up          Medications Prescribed:  Current Discharge Medication List        START taking these medications    Details   cephalexin 500 MG Oral Cap Take 1 capsule (500 mg total) by mouth 4 (four) times daily for 10 days.  Qty: 40 capsule, Refills: 0

## 2024-07-16 NOTE — ED INITIAL ASSESSMENT (HPI)
Patient has small laceration to second finger of left hand after cutting it on machinery at work. No active bleeding noted. Patient has updated tetanus shot.

## 2024-07-17 ENCOUNTER — APPOINTMENT (OUTPATIENT)
Dept: OTHER | Facility: HOSPITAL | Age: 29
End: 2024-07-17
Attending: PREVENTIVE MEDICINE

## 2024-07-18 ENCOUNTER — TELEPHONE (OUTPATIENT)
Dept: ORTHOPEDICS CLINIC | Facility: CLINIC | Age: 29
End: 2024-07-18

## 2024-07-18 DIAGNOSIS — M79.642 LEFT HAND PAIN: Primary | ICD-10-CM

## 2024-07-18 NOTE — TELEPHONE ENCOUNTER
Patient has an appointment scheduled with Dr. Todd on 7/23/24 for lac to index finger left hand. Repeat xray Per Dr. Todd. Can you please order and route back to me to schedule appointment. Thank you!

## 2024-07-22 NOTE — TELEPHONE ENCOUNTER
Future Appointments   Date Time Provider Department Center   7/23/2024 12:45 PM [facility] XR RM1 [facility] XRAY [facility] [place]   7/23/2024  1:00 PM Carlos Todd,  [facility]

## 2024-07-23 ENCOUNTER — HOSPITAL ENCOUNTER (OUTPATIENT)
Dept: GENERAL RADIOLOGY | Age: 29
Discharge: HOME | End: 2024-07-23
Attending: FAMILY MEDICINE
Payer: OTHER MISCELLANEOUS

## 2024-07-23 ENCOUNTER — OFFICE VISIT (OUTPATIENT)
Dept: ORTHOPEDICS CLINIC | Facility: CLINIC | Age: 29
End: 2024-07-23
Payer: OTHER MISCELLANEOUS

## 2024-07-23 VITALS — HEIGHT: 71 IN | BODY MASS INDEX: 25.2 KG/M2 | WEIGHT: 180 LBS

## 2024-07-23 DIAGNOSIS — M20.002 ACQUIRED FINGER DEFORMITY, LEFT: ICD-10-CM

## 2024-07-23 DIAGNOSIS — S61.211A LACERATION OF LEFT INDEX FINGER WITHOUT FOREIGN BODY WITHOUT DAMAGE TO NAIL, INITIAL ENCOUNTER: Primary | ICD-10-CM

## 2024-07-23 DIAGNOSIS — M79.642 LEFT HAND PAIN: ICD-10-CM

## 2024-07-23 PROCEDURE — 99204 OFFICE O/P NEW MOD 45 MIN: CPT | Performed by: FAMILY MEDICINE

## 2024-07-23 PROCEDURE — 73140 X-RAY EXAM OF FINGER(S): CPT | Performed by: FAMILY MEDICINE

## 2024-07-23 RX ORDER — FINASTERIDE 1 MG/1
1 TABLET, FILM COATED ORAL DAILY
COMMUNITY
Start: 2024-06-11

## 2024-07-23 RX ORDER — FLUDROCORTISONE ACETATE 0.1 MG/1
0.1 TABLET ORAL DAILY
COMMUNITY
Start: 2024-07-11

## 2024-07-26 ENCOUNTER — OFFICE VISIT (OUTPATIENT)
Facility: CLINIC | Age: 29
End: 2024-07-26
Payer: OTHER MISCELLANEOUS

## 2024-07-26 DIAGNOSIS — M20.002 ACQUIRED FINGER DEFORMITY, LEFT: ICD-10-CM

## 2024-07-26 DIAGNOSIS — S61.211A LACERATION OF LEFT INDEX FINGER WITHOUT FOREIGN BODY WITHOUT DAMAGE TO NAIL, INITIAL ENCOUNTER: Primary | ICD-10-CM

## 2024-07-26 NOTE — H&P
Sports Medicine Clinic Note     Subjective:    Chief Complaint: Left index finger injury.    Date of Injury: July 16, 2024    History of Present Illness: The patient is a 29-year-old male who presents with a left index finger injury sustained on July 16, 2024. The patient reports that his finger was pinched between two pieces of steel while at work, resulting in immediate pain and a visible laceration. The patient has a history of a prior fracture to the same finger that required surgery and is with residual deformity. He denies any numbness, tingling, or loss of function in the finger. The laceration was treated in the emergency room where it was anesthetized, cleansed, and sutured. The patient is now following up for further evaluation and management.    Objective:    Left Index Finger Examination:    Inspection:  - 2.5 cm full-thickness laceration over the distal phalanx, no extension into the nailbed  - Chronic deformity of the distal phalanx noted  - Soft tissue swelling present  - No signs of infection or foreign body    Palpation:  - No tenderness over the distal phalanx, including over the dorsal aspect  - No tenderness over the proximal or middle phalanges    Range of Motion:  - Full flexion and extension at DIP, PIP, and MCP joints without pain    Neurovascular:  - Intact sensation over the finger  - Capillary refill less than 2 seconds    Diagnostic Tests:    - Radiographs (X-ray) of the left index finger, three views, show marked deformity of the distal phalanx related to prior fracture, cortical disruption of the dorsal aspect of the distal phalanx potential acute fracture superimposed on the chronic deformity.    Assessment:    Laceration of the left index finger without foreign body, initial encounter  Chronic deformity of the distal phalanx of the left index finger, sequela of previous crush injury.    Plan:    Additional Imaging/Workup:  - Non at this time    Therapy:  - Referral to occupational  therapy for finger mobilization exercises to maintain range of motion and prevent stiffness if needed, deferred for time being    Medications:  - Prescribe Acetaminophen for pain management as needed    Bracing/Casting:  - Protective splinting of the finger to prevent further injury and allow healing    Procedures:  - Sutures to be removed in 10-14 days from injury, follow up later this week for this  - Continue wound care    Activity Recommendations:  - Advise the patient to limit use of the affected hand to light activities  - Avoid heavy lifting or tasks that could stress the injured finger    Follow-Up:  - Tentative follow-up in coming week for suture removal and reassessment of the injury  - Advise the patient to return sooner if there is increased pain, swelling, redness, or other signs of infection      Carlos Todd DO, CAQSM   Primary Care Sports Medicine    Department of Orthopaedic Surgery  [facility]    [address] [place] [address]  [address] [place] [address]    t: [phone]  f: [phone]      [facility]

## 2024-07-26 NOTE — PROGRESS NOTES
Sports Medicine Clinic Note     Subjective:    Chief Complaint: Follow-up for left index finger injury, suture removal.    Date of Injury: July 16, 2024     Interval History: The patient is a 29-year-old male returning for a virtual follow-up visit after sustaining a left index finger injury on July 16, 2024. Since the last visit, the patient reports improvement. He has been following wound care instructions and has kept the finger protected. The patient denies any numbness, tingling, or new trauma to the finger. He also reports that the laceration is healing well and he is here for suture removal as advised.    Objective:    Left Index Finger Examination:    Virtual Inspection:  - The laceration over the distal phalanx appears clean, dry, and well-healed with no signs of infection.  - Sutures were removed without difficulty.  - No erythema, increased warmth, or purulent discharge noted.  - Chronic deformity of the distal phalanx remains unchanged.  - Mild residual soft tissue swelling present.    Assessment:    1. Healing laceration of the left index finger.  2. Chronic deformity of the distal phalanx of the left index finger, sequela of previous crush injury.    Plan:    Additional Imaging/Workup:  - None indicated at this time.    Therapy:  - Referral to occupational therapy for finger mobilization exercises if stiffness develops, deferred for now.    Medications:  - Continue Acetaminophen for pain management as needed.    Bracing/Casting:  - Continue with protective splinting of the finger for another week to prevent further injury.    Procedures:  - Sutures removed today by medical assistant on site without issue.  - Continue with wound care, keeping the area clean and dry.    Activity Recommendations:  - Ok to gradually return to normal activities as tolerated.    Follow-Up:  - As needed. Instruct the patient to return sooner if there is increased pain, swelling, redness, or other signs of infection.    Suture  Removal Procedure:    Preparation:  The patient was positioned comfortably with the affected hand supported.  The nailbed and surrounding area were inspected for signs of infection or complications. No significant abnormalities were noted.    Suture Removal:  The area was cleaned with an antiseptic solution.  Using sterile technique, a suture removal kit was prepared.  Each suture was identified (3 total). Using sterile forceps, the knot of the first suture was grasped, and sterile scissors were used to cut the suture close to the skin.  The suture was gently pulled out, ensuring minimal discomfort for the patient.  This process was repeated for each suture until all sutures were removed.    Post-Removal Care:  The nailbed and surrounding skin were inspected again for any signs of dehiscence, infection, or other complications.  The area was cleaned again with an antiseptic solution.  A sterile bandage was applied to protect the area.    Telehealth Visit Disclaimer:    This clinician is part of the telehealth program and is conducting this visit in a currently approved location. For this visit the clinician and patient were present via interactive video telecommunications system that permits two-way, real-time/synchronous communications. This has been done in good concetta to provide continuity of care in the best interest of the provider-patient relationship, due to the ongoing public health crisis/national emergency and because of restrictions of visitation. There are limitations of this visit as no hands-on physical examination could be performed.    Patient consent given for telehealth visit: Yes  Patient and clinician are currently located in the state of [place].  The clinician is appropriately licensed in the above state to provide care for this visit.  Other individuals present during the telehealth encounter and their role/relation: Medical assistant Natasha Crocker who was performing suture removal  procedure.  Total time spent in medical video consultation (required if billing based on time): 30 minutes  Total time spent providing education, coordination of care/services, and counselin minutes    This billing was spent on history taking; observational physical exam; review of labs, medications, and radiology tests; and decision making.  Appropriate medical decision-making and tests are ordered as detailed in the plan of care above.      Carlos Todd DO, CAQSM   Primary Care Sports Medicine    Department of Orthopaedic Surgery  [facility]    [address] [place] [address]  [address] [place] [address]    t: [phone]  f: [phone]      [facility]

## (undated) NOTE — LETTER
Date: 7/26/2024    Patient Name: Shayan Keane          To Whom it may concern:    This letter has been written at the patient's request. The above patient was seen at [facility] for treatment of a medical condition.    The patient may return to work on 07/26/24 with no restrictions.        Sincerely,    Carlos Todd DO, GEORGIAM   Primary Care Sports Medicine    Department of Orthopaedic Surgery  [facility]    [address] [place] [address]  [address] [place] [address]    t: [phone]  f: [phone]      [facility]    Carlos DAUGHERTY DO

## (undated) NOTE — ED AVS SNAPSHOT
Radha Narens   MRN: [id number]    Department:  [facility] Emergency Department   Date of Visit:  10/12/2018           Disclosure     Insurance plans vary and the physician(s) referred by the ER may not be covered by your plan.  Please contact your tell this physician (or your personal doctor if your instructions are to return to your personal doctor) about any new or lasting problems. The primary care or specialist physician will see patients referred from the [facility] Emergency Department.  Everardo Leger

## (undated) NOTE — LETTER
Date & Time: 7/16/2024, 7:44 PM  Patient: Shayan Keane  Encounter Provider(s):    Mono Heath MD       To Whom It May Concern:    Shayan Keane was seen and treated in our department on 7/16/2024. He should not return to work until 7/17/24 and can return to work with these limitations: No use of left hand until cleared by occupational health .    If you have any questions or concerns, please do not hesitate to call.        _____________________________  Physician/APC Signature